# Patient Record
Sex: FEMALE | Race: ASIAN
[De-identification: names, ages, dates, MRNs, and addresses within clinical notes are randomized per-mention and may not be internally consistent; named-entity substitution may affect disease eponyms.]

---

## 2018-06-09 ENCOUNTER — HOSPITAL ENCOUNTER (EMERGENCY)
Dept: HOSPITAL 26 - MED | Age: 23
Discharge: HOME | End: 2018-06-09
Payer: SELF-PAY

## 2018-06-09 VITALS — HEIGHT: 64 IN | WEIGHT: 124.25 LBS | BODY MASS INDEX: 21.21 KG/M2

## 2018-06-09 VITALS — SYSTOLIC BLOOD PRESSURE: 147 MMHG | DIASTOLIC BLOOD PRESSURE: 86 MMHG

## 2018-06-09 VITALS — DIASTOLIC BLOOD PRESSURE: 80 MMHG | SYSTOLIC BLOOD PRESSURE: 132 MMHG

## 2018-06-09 DIAGNOSIS — F41.0: Primary | ICD-10-CM

## 2018-06-09 NOTE — NUR
PATIENT PRESENTS TO ED WITH ANXIETY AND SOB. RR CLEAR THROUGHOUT. PT STATES SHE 
HAS HAD A PANIC ATTACK IN THE PAST BUT NOT IN THE PAST 3 YEARS. DENIES N/V/D; 
SKIN IS PINK/WARM/DRY; AAOX4 WITH EVEN AND STEADY GAIT; LUNGS CLEAR BL; HR EVEN 
AND REGULAR; PT DENIES ANY FEVER, CP, SOB, OR COUGH AT THIS TIME; PATIENT 
STATES PAIN OF 0/10 AT THIS TIME; VSS; PATIENT POSITIONED FOR COMFORT; HOB 
ELEVATED; BEDRAILS UP X2; BED DOWN. ER MD MADE AWARE OF PT STATUS. CONTINUE TO 
MONITOR.